# Patient Record
Sex: MALE | Race: WHITE | NOT HISPANIC OR LATINO | Employment: STUDENT | URBAN - METROPOLITAN AREA
[De-identification: names, ages, dates, MRNs, and addresses within clinical notes are randomized per-mention and may not be internally consistent; named-entity substitution may affect disease eponyms.]

---

## 2017-06-28 ENCOUNTER — HISTORICAL (OUTPATIENT)
Dept: LAB | Facility: HOSPITAL | Age: 22
End: 2017-06-28

## 2017-07-01 LAB — FINAL CULTURE: NORMAL

## 2021-09-25 ENCOUNTER — HOSPITAL ENCOUNTER (EMERGENCY)
Facility: HOSPITAL | Age: 26
Discharge: HOME | End: 2021-09-25
Attending: EMERGENCY MEDICINE
Payer: COMMERCIAL

## 2021-09-25 VITALS
DIASTOLIC BLOOD PRESSURE: 89 MMHG | HEIGHT: 67 IN | TEMPERATURE: 98 F | SYSTOLIC BLOOD PRESSURE: 128 MMHG | RESPIRATION RATE: 16 BRPM | OXYGEN SATURATION: 100 % | HEART RATE: 88 BPM | BODY MASS INDEX: 23.54 KG/M2 | WEIGHT: 150 LBS

## 2021-09-25 DIAGNOSIS — S01.81XA LACERATION OF FOREHEAD, INITIAL ENCOUNTER: Primary | ICD-10-CM

## 2021-09-25 PROCEDURE — 99284 EMERGENCY DEPT VISIT MOD MDM: CPT | Mod: 25,,, | Performed by: PHYSICIAN ASSISTANT

## 2021-09-25 PROCEDURE — 99284 PR EMERGENCY DEPT VISIT,LEVEL IV: ICD-10-PCS | Mod: 25,,, | Performed by: PHYSICIAN ASSISTANT

## 2021-09-25 PROCEDURE — 12011 RPR F/E/E/N/L/M 2.5 CM/<: CPT | Mod: ,,, | Performed by: PHYSICIAN ASSISTANT

## 2021-09-25 PROCEDURE — 12011 RPR F/E/E/N/L/M 2.5 CM/<: CPT

## 2021-09-25 PROCEDURE — 99282 EMERGENCY DEPT VISIT SF MDM: CPT | Mod: 25

## 2021-09-25 PROCEDURE — 12011 PR RESUPERF WND FACE <2.5 CM: ICD-10-PCS | Mod: ,,, | Performed by: PHYSICIAN ASSISTANT

## 2022-04-10 ENCOUNTER — HISTORICAL (OUTPATIENT)
Dept: ADMINISTRATIVE | Facility: HOSPITAL | Age: 27
End: 2022-04-10
Payer: COMMERCIAL

## 2022-04-24 VITALS
SYSTOLIC BLOOD PRESSURE: 124 MMHG | BODY MASS INDEX: 24.19 KG/M2 | WEIGHT: 154.13 LBS | DIASTOLIC BLOOD PRESSURE: 87 MMHG | OXYGEN SATURATION: 98 % | HEIGHT: 67 IN

## 2023-08-09 ENCOUNTER — OFFICE VISIT (OUTPATIENT)
Dept: URGENT CARE | Facility: CLINIC | Age: 28
End: 2023-08-09
Payer: COMMERCIAL

## 2023-08-09 VITALS
TEMPERATURE: 98 F | HEART RATE: 73 BPM | DIASTOLIC BLOOD PRESSURE: 74 MMHG | OXYGEN SATURATION: 97 % | SYSTOLIC BLOOD PRESSURE: 134 MMHG | RESPIRATION RATE: 18 BRPM | HEIGHT: 67 IN | BODY MASS INDEX: 23.23 KG/M2 | WEIGHT: 148 LBS

## 2023-08-09 DIAGNOSIS — R42 EPISODIC LIGHTHEADEDNESS: ICD-10-CM

## 2023-08-09 DIAGNOSIS — R53.83 FATIGUE, UNSPECIFIED TYPE: Primary | ICD-10-CM

## 2023-08-09 LAB
ALBUMIN SERPL-MCNC: 4.7 G/DL (ref 3.5–5)
ALBUMIN/GLOB SERPL: 2.1 RATIO (ref 1.1–2)
ALP SERPL-CCNC: 55 UNIT/L (ref 40–150)
ALT SERPL-CCNC: 12 UNIT/L (ref 0–55)
AST SERPL-CCNC: 18 UNIT/L (ref 5–34)
BASOPHILS # BLD AUTO: 0.07 X10(3)/MCL
BASOPHILS NFR BLD AUTO: 0.8 %
BILIRUB SERPL-MCNC: 0.8 MG/DL
BUN SERPL-MCNC: 7.9 MG/DL (ref 8.9–20.6)
CALCIUM SERPL-MCNC: 9.4 MG/DL (ref 8.4–10.2)
CHLORIDE SERPL-SCNC: 93 MMOL/L (ref 98–107)
CK SERPL-CCNC: 154 U/L (ref 30–200)
CO2 SERPL-SCNC: 28 MMOL/L (ref 22–29)
CREAT SERPL-MCNC: 0.77 MG/DL (ref 0.73–1.18)
EOSINOPHIL # BLD AUTO: 0.06 X10(3)/MCL (ref 0–0.9)
EOSINOPHIL NFR BLD AUTO: 0.7 %
ERYTHROCYTE [DISTWIDTH] IN BLOOD BY AUTOMATED COUNT: 12.2 % (ref 11.5–17)
GFR SERPLBLD CREATININE-BSD FMLA CKD-EPI: >60 MLS/MIN/1.73/M2
GLOBULIN SER-MCNC: 2.2 GM/DL (ref 2.4–3.5)
GLUCOSE SERPL-MCNC: 136 MG/DL (ref 74–100)
HCT VFR BLD AUTO: 41.3 % (ref 42–52)
HGB BLD-MCNC: 14.2 G/DL (ref 14–18)
IMM GRANULOCYTES # BLD AUTO: 0.03 X10(3)/MCL (ref 0–0.04)
IMM GRANULOCYTES NFR BLD AUTO: 0.4 %
LYMPHOCYTES # BLD AUTO: 1.49 X10(3)/MCL (ref 0.6–4.6)
LYMPHOCYTES NFR BLD AUTO: 17.9 %
MCH RBC QN AUTO: 30 PG (ref 27–31)
MCHC RBC AUTO-ENTMCNC: 34.4 G/DL (ref 33–36)
MCV RBC AUTO: 87.3 FL (ref 80–94)
MONOCYTES # BLD AUTO: 0.74 X10(3)/MCL (ref 0.1–1.3)
MONOCYTES NFR BLD AUTO: 8.9 %
NEUTROPHILS # BLD AUTO: 5.92 X10(3)/MCL (ref 2.1–9.2)
NEUTROPHILS NFR BLD AUTO: 71.3 %
NRBC BLD AUTO-RTO: 0 %
PLATELET # BLD AUTO: 219 X10(3)/MCL (ref 130–400)
PMV BLD AUTO: 10.5 FL (ref 7.4–10.4)
POTASSIUM SERPL-SCNC: 3.6 MMOL/L (ref 3.5–5.1)
PROT SERPL-MCNC: 6.9 GM/DL (ref 6.4–8.3)
RBC # BLD AUTO: 4.73 X10(6)/MCL (ref 4.7–6.1)
SODIUM SERPL-SCNC: 129 MMOL/L (ref 136–145)
TSH SERPL-ACNC: 0.52 UIU/ML (ref 0.35–4.94)
WBC # SPEC AUTO: 8.31 X10(3)/MCL (ref 4.5–11.5)

## 2023-08-09 PROCEDURE — 99213 OFFICE O/P EST LOW 20 MIN: CPT | Mod: ,,, | Performed by: FAMILY MEDICINE

## 2023-08-09 PROCEDURE — 80053 COMPREHEN METABOLIC PANEL: CPT | Performed by: FAMILY MEDICINE

## 2023-08-09 PROCEDURE — 85025 COMPLETE CBC W/AUTO DIFF WBC: CPT | Performed by: FAMILY MEDICINE

## 2023-08-09 PROCEDURE — 84443 ASSAY THYROID STIM HORMONE: CPT | Performed by: FAMILY MEDICINE

## 2023-08-09 PROCEDURE — 82550 ASSAY OF CK (CPK): CPT | Performed by: FAMILY MEDICINE

## 2023-08-09 PROCEDURE — 36415 COLL VENOUS BLD VENIPUNCTURE: CPT | Performed by: FAMILY MEDICINE

## 2023-08-09 PROCEDURE — 99213 PR OFFICE/OUTPT VISIT, EST, LEVL III, 20-29 MIN: ICD-10-PCS | Mod: ,,, | Performed by: FAMILY MEDICINE

## 2023-08-09 RX ORDER — TALC
6 POWDER (GRAM) TOPICAL NIGHTLY PRN
COMMUNITY

## 2023-08-09 RX ORDER — ASPIRIN 325 MG
325 TABLET ORAL DAILY
COMMUNITY

## 2023-08-09 NOTE — PROGRESS NOTES
Patient ID: [id number]     Chief Complaint:  Vomiting, dizziness, diarrhea    History of Present Illness:     Pierce Liam is a 28 y.o. male  who presents today for symptoms of Dizziness ( Patient is a 28 y.o. male who presents to urgent care with complaints of vomiting, dizziness, diarrhea, trouble concentrating x2 weeks. Alleviating factors include fluids, bed rest with copious amount of relief. Patient denies being around anyone who has been sick that he is aware of. )    28-year-old male with no significant past medical history presents today with a 2 week history of episodic lightheadedness, fatigue, and mental fog.  He works outdoors as a .  At the beginning of the 2 week.  He had a few episodes of vomiting with no blood but that subsided.  Today he had an episode of diarrhea with no blood.  He is had no upper respiratory symptoms, no shortness of breath, no chest pain.  The lightheadedness originally felt more like vertigo to him at the beginning of the 2 week period but that changed.  He is had no loss of consciousness or presyncopal episodes.  No new medications, no recent changes in medications.  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  This lightheadedness does not change with body or head position or movement, and sometimes occurs when he is sitting still and not moving his head.    Pt denies experiencing any fevers, chills, difficulty breathing, dysphagia, or neck stiffness.    Past Medical History:     ----------------------------  Depression     History reviewed. No pertinent surgical history.    Review of patient's allergies indicates:  No Known Allergies    Outpatient Medications Marked as Taking for the 8/9/23 encounter (Office Visit) with Nikos Graf MD   Medication Sig Dispense Refill    aspirin 325 MG tablet Take 325 mg by mouth once daily.      melatonin (MELATIN) 3 mg tablet Take 6 mg by mouth  "nightly as needed for Insomnia.         Social History     Socioeconomic History    Marital status: Single   Tobacco Use    Smoking status: Never    Smokeless tobacco: Never   Substance and Sexual Activity    Alcohol use: Yes     Comment: occas    Drug use: Never        Family History   Problem Relation Age of Onset    Hypertension Father     Hypertension Maternal Grandfather         Subjective:     Review of Systems   Constitutional:  Negative for chills, fever and malaise/fatigue.   HENT:  Negative for congestion, ear discharge, ear pain, sinus pain and sore throat.    Respiratory:  Negative for cough, sputum production, shortness of breath, wheezing and stridor.    Gastrointestinal:  Negative for abdominal pain, diarrhea, nausea and vomiting.   Genitourinary:  Negative for dysuria, frequency and urgency.   Musculoskeletal:  Negative for neck pain.   Skin:  Negative for rash.   Neurological:  Negative for headaches.       Objective:     /74   Pulse 73   Temp 98.4 °F (36.9 °C)   Resp 18   Ht 5' 7" (1.702 m)   Wt 67.1 kg (148 lb)   SpO2 97%   BMI 23.18 kg/m²     Physical Exam  Constitutional:       General: He is not in acute distress.     Appearance: Normal appearance. He is normal weight. He is not ill-appearing or toxic-appearing.   HENT:      Head: Normocephalic and atraumatic.      Right Ear: Tympanic membrane and ear canal normal.      Left Ear: Tympanic membrane and ear canal normal.      Nose: No congestion or rhinorrhea.      Mouth/Throat:      Mouth: Mucous membranes are moist.      Pharynx: Oropharynx is clear. No oropharyngeal exudate or posterior oropharyngeal erythema.   Eyes:      General:         Right eye: No discharge.         Left eye: No discharge.      Extraocular Movements: Extraocular movements intact.      Conjunctiva/sclera: Conjunctivae normal.   Cardiovascular:      Rate and Rhythm: Normal rate and regular rhythm.      Heart sounds: Normal heart sounds. No murmur heard.     No " friction rub. No gallop.   Pulmonary:      Effort: Pulmonary effort is normal. No respiratory distress.      Breath sounds: Normal breath sounds. No stridor. No wheezing, rhonchi or rales.   Chest:      Chest wall: No tenderness.   Musculoskeletal:      Cervical back: No rigidity or tenderness.   Lymphadenopathy:      Cervical: No cervical adenopathy.   Skin:     Capillary Refill: Capillary refill takes less than 2 seconds.      Coloration: Skin is not pale.   Neurological:      Mental Status: He is alert and oriented to person, place, and time. Mental status is at baseline.   Psychiatric:         Mood and Affect: Mood normal.         Behavior: Behavior normal.         Assessment & Plan:       ICD-10-CM ICD-9-CM   1. Fatigue, unspecified type  R53.83 780.79   2. Episodic lightheadedness  R42 780.4        1. Fatigue, unspecified type  -     CBC Auto Differential; Future; Expected date: 08/09/2023  -     Comprehensive Metabolic Panel  -     CK; Future; Expected date: 08/09/2023  -     TSH; Future; Expected date: 08/09/2023    2. Episodic lightheadedness  -     CBC Auto Differential; Future; Expected date: 08/09/2023  -     Comprehensive Metabolic Panel  -     CK; Future; Expected date: 08/09/2023  -     TSH; Future; Expected date: 08/09/2023       Vitals are stable today.  Physical exam is normal.  Does not need IV fluid administration at this point.  We will get basic labs and have him follow-up with PCP.

## 2023-08-09 NOTE — LETTER
August 9, 2023      [facility]  [address], [address]  [place] [address]  Phone: [phone]  Fax: [phone]       Patient: Pierce Lima   YOB: 1995  Date of Visit: 08/09/2023    To Whom It May Concern:    Ann Lima  was at [facility] on 08/09/2023. The patient may return to work/school on 08/10/2023 with no restrictions. If you have any questions or concerns, or if I can be of further assistance, please do not hesitate to contact me.    Sincerely,    Nikos Graf MD

## 2023-08-09 NOTE — PATIENT INSTRUCTIONS
We will call you with your laboratory results and forward them to your primary care physician tomorrow.    Please go to the emergency room if you develop any alarming signs or symptoms.

## 2023-08-10 NOTE — PROGRESS NOTES
Please contact Talha to have him follow up with us in clinic tomorrow if possible. Would like to know how he is feeling today. His low sodium level is likely from decreased oral intake. Would love for him to try some bland foods today (toast, eggs, rice, potatoes, bananas) and increase his fluid intake ( would recommend Pedialyte in addition to water). Will need to repeat his labs at our visit.

## 2023-12-06 PROCEDURE — 83090 ASSAY OF HOMOCYSTEINE: CPT | Performed by: STUDENT IN AN ORGANIZED HEALTH CARE EDUCATION/TRAINING PROGRAM

## 2023-12-06 PROCEDURE — 82607 VITAMIN B-12: CPT | Performed by: STUDENT IN AN ORGANIZED HEALTH CARE EDUCATION/TRAINING PROGRAM

## 2024-02-11 PROBLEM — F33.1 MAJOR DEPRESSIVE DISORDER, RECURRENT, MODERATE: Status: ACTIVE | Noted: 2024-02-11

## 2024-02-13 PROBLEM — F33.1 MAJOR DEPRESSIVE DISORDER, RECURRENT, MODERATE: Chronic | Status: ACTIVE | Noted: 2024-02-11

## 2024-05-15 PROBLEM — Z00.00 WELLNESS EXAMINATION: Status: ACTIVE | Noted: 2024-05-15

## 2024-05-25 ENCOUNTER — OFFICE VISIT (OUTPATIENT)
Dept: URGENT CARE | Facility: CLINIC | Age: 29
End: 2024-05-25
Payer: COMMERCIAL

## 2024-05-25 VITALS
OXYGEN SATURATION: 97 % | SYSTOLIC BLOOD PRESSURE: 121 MMHG | BODY MASS INDEX: 25.27 KG/M2 | RESPIRATION RATE: 16 BRPM | TEMPERATURE: 98 F | DIASTOLIC BLOOD PRESSURE: 74 MMHG | HEIGHT: 67 IN | WEIGHT: 161 LBS | HEART RATE: 66 BPM

## 2024-05-25 DIAGNOSIS — T67.5XXA HEAT EXHAUSTION, INITIAL ENCOUNTER: Primary | ICD-10-CM

## 2024-05-25 PROCEDURE — 99214 OFFICE O/P EST MOD 30 MIN: CPT | Mod: ,,, | Performed by: FAMILY MEDICINE

## 2024-05-25 RX ORDER — SODIUM CHLORIDE, SODIUM LACTATE, POTASSIUM CHLORIDE, CALCIUM CHLORIDE 600; 310; 30; 20 MG/100ML; MG/100ML; MG/100ML; MG/100ML
INJECTION, SOLUTION INTRAVENOUS
Status: COMPLETED | OUTPATIENT
Start: 2024-05-25 | End: 2024-05-25

## 2024-05-25 RX ADMIN — SODIUM CHLORIDE, SODIUM LACTATE, POTASSIUM CHLORIDE, CALCIUM CHLORIDE: 600; 310; 30; 20 INJECTION, SOLUTION INTRAVENOUS at 03:05

## 2024-05-25 NOTE — PATIENT INSTRUCTIONS
Discussed the condition and course in detail.  IV fluids Ringer lactate in the clinic today.  Patient tolerated well.  Symptoms resolved.  Discharged home with stable vital signs  Encouraged to monitor the symptoms, conservative methods to stay hydrated and cooling methods at work  Call return to clinic for any questions.  Follow up with primary MD.  Voiced understanding  Call this clinic for any questions.  ER precautions

## 2024-05-25 NOTE — PROGRESS NOTES
"Subjective:      Patient ID: Pierce Lima is a 29 y.o. male.    Vitals:  height is 5' 7" (1.702 m) and weight is 73 kg (161 lb). His temperature is 97.7 °F (36.5 °C). His blood pressure is 122/81 and his pulse is 72. His respiration is 16 and oxygen saturation is 100%.     Chief Complaint: Dizziness     Patient is a 29 y.o. male who presents to urgent care with complaints of dizziness, nausea, off balance x today- states worked out side in the sun. Alleviating factors include none.   ROS :  Constitutional : No fever, no fatigue lens of feeling dizzy since today  Neck : Negative except HPI  Respiratory : No shortness of breath, no wheezing  Cardiovascular : No chest pain  Musculoskeletal : Negative except HPI  Integumentary : No rash, no abnormal lesion  Neurological : Negative for tingling numbness and weakness     29-year-old male present to clinic with concerns of feeling nauseated and exhausted today started around 1:00 p.m..  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  Tried hydration like Gatorade Powerade and Pedialyte.  Similar complaints two days ago.  No fall no trauma.  No vomiting.  Patient had wellness exam with primary MD 10 days ago, labs in the chart reviewed.  No chest pain, no palpitations.  No shortness a breath or dyspnea on exertion.    Objective:     Physical Exam  General : Alert and Oriented, No apparent distress, afebrile, appears comfortable on exam table, clear speech, appears anxious  Neck - supple  HENT : Oropharynx no redness or swelling.   Respiratory : Bilateral equal breath sounds, nonlabored respirations  Cardiovascular : Rate, rhythm regular, normal volume pulse, no murmur  Gastrointestinal: Full abdomen, soft, nontender to palpate  Integumentary : Warm, Dry and no rash    IV Ringer lactate started 3:15 p.m..  Patient appears comfortable sitting reclined on the exam table.  No new symptoms.  Continuing IV fluids  3:30 p.m.:  Patient appears comfortable on the exam table.  " Denies shortness a breath or chest pain.  No nausea or vomiting.  3:50 p.m.:  Tolerating IV fluids.  No new complaints.  Lungs clear to auscultate.  States symptoms much improved.  4.10 p.m.:  Used restroom  4:20 p.m.:  Completed IV fluids.  Symptoms resolved.  Discharged home with stable vital signs  Assessment:     1. Heat exhaustion, initial encounter      Plan:   Discussed the condition and course in detail.  IV fluids Ringer lactate in the clinic today.  Patient tolerated well.  Symptoms resolved.  Discharged home with stable vital signs  Encouraged to monitor the symptoms, conservative methods to stay hydrated and cooling methods at work  Call return to clinic for any questions.  Follow up with primary MD.  Voiced understanding  Call this clinic for any questions.  ER precautions    Heat exhaustion, initial encounter  -     lactated ringers infusion

## 2024-05-29 ENCOUNTER — OFFICE VISIT (OUTPATIENT)
Dept: URGENT CARE | Facility: CLINIC | Age: 29
End: 2024-05-29
Payer: COMMERCIAL

## 2024-05-29 VITALS
WEIGHT: 161 LBS | RESPIRATION RATE: 18 BRPM | OXYGEN SATURATION: 99 % | HEART RATE: 70 BPM | DIASTOLIC BLOOD PRESSURE: 89 MMHG | SYSTOLIC BLOOD PRESSURE: 139 MMHG | HEIGHT: 67 IN | BODY MASS INDEX: 25.27 KG/M2 | TEMPERATURE: 98 F

## 2024-05-29 DIAGNOSIS — R53.83 FATIGUE, UNSPECIFIED TYPE: Primary | ICD-10-CM

## 2024-05-29 PROCEDURE — 99213 OFFICE O/P EST LOW 20 MIN: CPT | Mod: ,,, | Performed by: NURSE PRACTITIONER

## 2024-05-29 NOTE — LETTER
May 29, 2024      [facility]  [address], [address]  [place] [address]  Phone: [phone]  Fax: [phone]       Patient: Pierce Lima   YOB: 1995  Date of Visit: 05/29/2024    To Whom It May Concern:    Ann Lima  was at [facility] on 05/29/2024. The patient may return to work/school on 5/30/2024 with no restrictions. If you have any questions or concerns, or if I can be of further assistance, please do not hesitate to contact me.    Sincerely,    Nolan Cox NP

## 2024-05-29 NOTE — PATIENT INSTRUCTIONS
As discussed keep scheduled follow up appointment on Friday  Monitor for any worsening symptoms and follow up with PCP if improvement is not occurring in the near future

## 2024-05-29 NOTE — PROGRESS NOTES
"Subjective:      Patient ID: Pierce Lima is a 29 y.o. male.    Vitals:  height is 5' 7" (1.702 m) and weight is 73 kg (161 lb). His temperature is 97.6 °F (36.4 °C). His blood pressure is 139/89 and his pulse is 70. His respiration is 18 and oxygen saturation is 99%.     Chief Complaint: Nausea    29 y.o. male presents to clinic with c/o nausea, brain fog, difficulty concentrating starting at noon today. Pt states he's been having these episodes x 1 year.  Was seen 5 days ago and given IV fluids for dehydration like symptoms states feeling hydrated and has been drinking plenty of fluids and electrolyte replacement drinks but still having ongoing intermittent headaches intermittent nausea and feelings of brain fog.  We did discuss that he has tried multiple medications throughout his past for anxiety and depression but none have ever been very effective.  He has currently not on any medication.  He was seen by his PCP roughly 2 weeks ago and states having completely normal lab work and voicing these concerns to his PCP nothing was further ordered or investigated afterwards.  He does have a appointment with his psychiatrist in 2 days.  Denies any tachycardia palpitations dizziness currently he has been eating and drinking well        Constitution: Positive for fatigue.   HENT: Negative.     Neck: neck negative.   Cardiovascular: Negative.    Eyes: Negative.    Respiratory: Negative.     Endocrine: negative.   Genitourinary: Negative.    Skin: Negative.    Neurological:  Positive for headaches.      Objective:     Physical Exam   Constitutional: He is oriented to person, place, and time. He appears well-developed. He is cooperative.  Non-toxic appearance. He does not appear ill. No distress.   HENT:   Head: Normocephalic and atraumatic.   Ears:   Right Ear: Hearing, tympanic membrane, external ear and ear canal normal.   Left Ear: Hearing, tympanic membrane, external ear and ear canal normal.   Nose: Nose normal. " No mucosal edema, rhinorrhea or nasal deformity. No epistaxis. Right sinus exhibits no maxillary sinus tenderness and no frontal sinus tenderness. Left sinus exhibits no maxillary sinus tenderness and no frontal sinus tenderness.   Mouth/Throat: Uvula is midline, oropharynx is clear and moist and mucous membranes are normal. No trismus in the jaw. Normal dentition. No uvula swelling. No oropharyngeal exudate, posterior oropharyngeal edema or posterior oropharyngeal erythema.   Eyes: Conjunctivae and lids are normal. No scleral icterus.   Neck: Trachea normal and phonation normal. Neck supple. No edema present. No erythema present. No neck rigidity present.   Cardiovascular: Normal rate, regular rhythm, normal heart sounds and normal pulses.   Pulmonary/Chest: Effort normal and breath sounds normal. No respiratory distress. He has no decreased breath sounds. He has no rhonchi.   Abdominal: Normal appearance.   Musculoskeletal: Normal range of motion.         General: No deformity. Normal range of motion.   Neurological: He is alert and oriented to person, place, and time. He exhibits normal muscle tone. Coordination normal.   Skin: Skin is warm, dry, intact, not diaphoretic and not pale.   Psychiatric: His speech is normal and behavior is normal. Judgment and thought content normal.   Nursing note and vitals reviewed.      Assessment:     1. Fatigue, unspecified type        Plan:   After discussion with the patient it was advised to follow up with his scheduled appointment on Friday with his psych NP for further guidance and evaluation of this questionable feeling a brain fog and inability to concentrate.    Fatigue, unspecified type

## 2024-07-05 ENCOUNTER — OFFICE VISIT (OUTPATIENT)
Dept: URGENT CARE | Facility: CLINIC | Age: 29
End: 2024-07-05
Payer: COMMERCIAL

## 2024-07-05 VITALS
HEIGHT: 67 IN | WEIGHT: 160 LBS | RESPIRATION RATE: 18 BRPM | SYSTOLIC BLOOD PRESSURE: 127 MMHG | HEART RATE: 60 BPM | DIASTOLIC BLOOD PRESSURE: 81 MMHG | TEMPERATURE: 99 F | BODY MASS INDEX: 25.11 KG/M2 | OXYGEN SATURATION: 99 %

## 2024-07-05 DIAGNOSIS — R53.83 FATIGUE, UNSPECIFIED TYPE: Primary | ICD-10-CM

## 2024-07-05 LAB
ALBUMIN SERPL-MCNC: 4.2 G/DL (ref 3.5–5)
ALBUMIN/GLOB SERPL: 1.5 RATIO (ref 1.1–2)
ALP SERPL-CCNC: 63 UNIT/L (ref 40–150)
ALT SERPL-CCNC: 19 UNIT/L (ref 0–55)
ANION GAP SERPL CALC-SCNC: 10 MEQ/L
AST SERPL-CCNC: 19 UNIT/L (ref 5–34)
BASOPHILS # BLD AUTO: 0.06 X10(3)/MCL
BASOPHILS NFR BLD AUTO: 0.7 %
BILIRUB SERPL-MCNC: 0.5 MG/DL
BUN SERPL-MCNC: 10.9 MG/DL (ref 8.9–20.6)
CALCIUM SERPL-MCNC: 9.5 MG/DL (ref 8.4–10.2)
CHLORIDE SERPL-SCNC: 101 MMOL/L (ref 98–107)
CO2 SERPL-SCNC: 26 MMOL/L (ref 22–29)
CREAT SERPL-MCNC: 0.88 MG/DL (ref 0.73–1.18)
CREAT/UREA NIT SERPL: 12
EOSINOPHIL # BLD AUTO: 0.21 X10(3)/MCL (ref 0–0.9)
EOSINOPHIL NFR BLD AUTO: 2.5 %
ERYTHROCYTE [DISTWIDTH] IN BLOOD BY AUTOMATED COUNT: 12.1 % (ref 11.5–17)
GFR SERPLBLD CREATININE-BSD FMLA CKD-EPI: >60 ML/MIN/1.73/M2
GLOBULIN SER-MCNC: 2.8 GM/DL (ref 2.4–3.5)
GLUCOSE SERPL-MCNC: 127 MG/DL (ref 74–100)
HCT VFR BLD AUTO: 42.8 % (ref 42–52)
HGB BLD-MCNC: 14.8 G/DL (ref 14–18)
IMM GRANULOCYTES # BLD AUTO: 0.04 X10(3)/MCL (ref 0–0.04)
IMM GRANULOCYTES NFR BLD AUTO: 0.5 %
LYMPHOCYTES # BLD AUTO: 1.86 X10(3)/MCL (ref 0.6–4.6)
LYMPHOCYTES NFR BLD AUTO: 21.8 %
MCH RBC QN AUTO: 30.7 PG (ref 27–31)
MCHC RBC AUTO-ENTMCNC: 34.6 G/DL (ref 33–36)
MCV RBC AUTO: 88.8 FL (ref 80–94)
MONOCYTES # BLD AUTO: 0.6 X10(3)/MCL (ref 0.1–1.3)
MONOCYTES NFR BLD AUTO: 7 %
NEUTROPHILS # BLD AUTO: 5.78 X10(3)/MCL (ref 2.1–9.2)
NEUTROPHILS NFR BLD AUTO: 67.5 %
NRBC BLD AUTO-RTO: 0 %
PLATELET # BLD AUTO: 240 X10(3)/MCL (ref 130–400)
PMV BLD AUTO: 10.7 FL (ref 7.4–10.4)
POTASSIUM SERPL-SCNC: 4 MMOL/L (ref 3.5–5.1)
PROT SERPL-MCNC: 7 GM/DL (ref 6.4–8.3)
RBC # BLD AUTO: 4.82 X10(6)/MCL (ref 4.7–6.1)
SODIUM SERPL-SCNC: 137 MMOL/L (ref 136–145)
TSH SERPL-ACNC: 0.52 UIU/ML (ref 0.35–4.94)
WBC # BLD AUTO: 8.55 X10(3)/MCL (ref 4.5–11.5)

## 2024-07-05 PROCEDURE — 36415 COLL VENOUS BLD VENIPUNCTURE: CPT | Performed by: PHYSICIAN ASSISTANT

## 2024-07-05 NOTE — PROGRESS NOTES
"Subjective:      Patient ID: Pierce Lima is a 29 y.o. male.    Vitals:  height is 5' 7" (1.702 m) and weight is 72.6 kg (160 lb). His temperature is 99 °F (37.2 °C). His blood pressure is 127/81 and his pulse is 60. His respiration is 18 and oxygen saturation is 99%.     Chief Complaint: Fatigue     Patient is a 29 y.o. male who presents to urgent care with complaints of fatigue and lack of ability to concentrate. Pt was seen on 5/29/24 for same symptoms. Pt states he's seen his PCP regarding the issue but his PCP believes the fatigue is due to his depression.  Patient did start Vraylar this week.      ROS   Objective:     Physical Exam   Constitutional: He is oriented to person, place, and time. He appears well-developed. He is cooperative.  Non-toxic appearance. He does not appear ill. No distress.   HENT:   Head: Normocephalic and atraumatic.   Ears:   Right Ear: Hearing, tympanic membrane, external ear and ear canal normal.   Left Ear: Hearing, tympanic membrane, external ear and ear canal normal.   Nose: Nose normal. No nasal deformity. No epistaxis.   Mouth/Throat: Uvula is midline, oropharynx is clear and moist and mucous membranes are normal. No trismus in the jaw. Normal dentition. No uvula swelling. No oropharyngeal exudate, posterior oropharyngeal edema or posterior oropharyngeal erythema.   Eyes: Conjunctivae and lids are normal. No scleral icterus.   Neck: Trachea normal and phonation normal. Neck supple. No edema present. No erythema present. No neck rigidity present.   Cardiovascular: Normal rate, regular rhythm, normal heart sounds and normal pulses.   Pulmonary/Chest: Effort normal and breath sounds normal. No respiratory distress. He has no decreased breath sounds. He has no rhonchi.   Abdominal: Normal appearance.   Musculoskeletal: Normal range of motion.         General: No deformity. Normal range of motion.   Neurological: He is alert and oriented to person, place, and time. He exhibits " "normal muscle tone. Coordination normal.   Skin: Skin is warm, dry, intact, not diaphoretic and not pale.   Psychiatric: His speech is normal and behavior is normal. Judgment and thought content normal.   Nursing note and vitals reviewed.  Labs appear normal in May.  Patient is requesting repeat lab work         Previous History      Review of patient's allergies indicates:  No Known Allergies    Past Medical History:   Diagnosis Date    Anxiety     Depression      Current Outpatient Medications   Medication Instructions    aspirin 325 mg, Oral, Daily    melatonin (MELATIN) 6 mg, Oral, Nightly PRN    propranoloL (INDERAL [place]) 60 mg, Oral    VRAYLAR 1.5 mg, Oral, Daily     Past Surgical History:   Procedure Laterality Date    NO PAST SURGERIES       Family History   Problem Relation Name Age of Onset    No Known Problems Mother      Hypertension Father      No Known Problems Sister      No Known Problems Brother      Hypertension Maternal Grandfather      No Known Problems Other         Social History     Tobacco Use    Smoking status: Never     Passive exposure: Never    Smokeless tobacco: Never   Substance Use Topics    Alcohol use: Yes     Comment: occas    Drug use: Never        Physical Exam      Vital Signs Reviewed   /81   Pulse 60   Temp 99 °F (37.2 °C)   Resp 18   Ht 5' 7" (1.702 m)   Wt 72.6 kg (160 lb)   SpO2 99%   BMI 25.06 kg/m²        Procedures    Procedures     Labs     Results for orders placed or performed in visit on 05/15/24   Comprehensive Metabolic Panel   Result Value Ref Range    Sodium 140 136 - 145 mmol/L    Potassium 4.3 3.5 - 5.1 mmol/L    Chloride 101 98 - 107 mmol/L    CO2 29 21 - 32 mmol/L    Glucose 91 74 - 106 mg/dL    Blood Urea Nitrogen 16 7.0 - 18.0 mg/dL    Creatinine 0.77 0.60 - 1.30 mg/dL    Calcium 10.1 8.5 - 10.1 mg/dL    Protein Total 7.0 6.4 - 8.2 gm/dL    Albumin 4.9 3.4 - 5.0 g/dL    Globulin 2.1 1.2 - 3.0 gm/dL    Albumin/Globulin Ratio 2.3 (H) 1.5 - 2.0 ratio "    Bilirubin Total 0.6 0.2 - 1.0 mg/dL    ALP 45 38 - 126 unit/L    ALT 17 7 - 52 unit/L    AST 18 15 - 37 unit/L    eGFR >60 mL/min/1.73/m2   Bilirubin, Direct   Result Value Ref Range    Bilirubin Direct 0.1 0.0 - 0.5 mg/dL   Lipid Panel   Result Value Ref Range    Cholesterol Total 197 0 - 200 mg/dL    HDL Cholesterol 59 35 - 60 mg/dL    Triglyceride 91 30 - 150 mg/dL    Cholesterol/HDL Ratio 3     Very Low Density Lipoprotein 18     LDL Cholesterol 120.00 0.00 - 129.00 mg/dL   TSH   Result Value Ref Range    TSH 0.925 0.350 - 4.940 uIU/mL   Urinalysis   Result Value Ref Range    Color, UA Yellow Yellow, Light-Yellow, Dark Yellow, Lilly, Straw    Appearance, UA Clear Clear    Specific Gravity, UA 1.010 1.005 - 1.030    pH, UA 7.0 5.0 - 8.5    Protein, UA Negative Negative    Glucose, UA Negative Negative, Normal    Ketones, UA Negative Negative    Blood, UA Negative Negative    Bilirubin, UA Negative Negative    Urobilinogen, UA 0.2 0.2, 1.0, Normal    Nitrites, UA Negative Negative    Leukocyte Esterase, UA Negative Negative   CBC with Differential   Result Value Ref Range    WBC 5.90 4.50 - 11.50 x10(3)/mcL    RBC 4.93 4.70 - 6.10 x10(6)/mcL    Hgb 14.4 14.0 - 18.0 g/dL    Hct 43.3 42.0 - 52.0 %    MCV 87.8 80.0 - 94.0 fL    MCH 29.2 27.0 - 31.0 pg    MCHC 33.3 33.0 - 36.0 g/dL    RDW 12.9 11.5 - 17.0 %    Platelet 209 130 - 400 x10(3)/mcL    MPV 9.5 7.4 - 10.4 fL    Neut % 61.8 %    Lymph % 29.2 %    Mono % 9.0 %    Lymph # 1.7 0.6 - 4.6 x10(3)/mcL    Neut # 3.7 2.1 - 9.2 x10(3)/mcL    Mono # 0.5 0.1 - 1.3 x10(3)/mcL   Urinalysis, Microscopic   Result Value Ref Range    Bacteria, UA None Seen None Seen, Rare, Occasional /HPF    RBC, UA None Seen None Seen, 0-2, 3-5, 0-5 /HPF    WBC, UA None Seen None Seen, 0-2, 3-5, 0-5 /HPF    Squamous Epithelial Cells, UA None Seen None Seen, Rare, Occasional, Occ /HPF       Assessment:     1. Fatigue, unspecified type        Plan:       Fatigue, unspecified type  -     CBC  Auto Differential  -     Comprehensive Metabolic Panel  -     TSH; Future; Expected date: 07/05/2024        Follow up with your primary care doctor.

## 2024-07-06 NOTE — PROGRESS NOTES
Lab work all essentially within normal limits.  Since this was a nonfasting lab work set, glucose is within normal limits for a nonfasting glucose check.  Complete blood count relatively unremarkable.  Based on these symptoms fatigue is likely a result of state of mental health versus other.  Follow up with referral to Psychiatry as well as PCP.

## 2024-08-19 PROBLEM — Z00.00 WELLNESS EXAMINATION: Status: RESOLVED | Noted: 2024-05-15 | Resolved: 2024-08-19

## 2024-11-27 ENCOUNTER — OFFICE VISIT (OUTPATIENT)
Dept: URGENT CARE | Facility: CLINIC | Age: 29
End: 2024-11-27
Payer: COMMERCIAL

## 2024-11-27 VITALS
BODY MASS INDEX: 25.9 KG/M2 | HEIGHT: 67 IN | WEIGHT: 165 LBS | OXYGEN SATURATION: 98 % | RESPIRATION RATE: 18 BRPM | SYSTOLIC BLOOD PRESSURE: 134 MMHG | HEART RATE: 75 BPM | DIASTOLIC BLOOD PRESSURE: 91 MMHG | TEMPERATURE: 98 F

## 2024-11-27 DIAGNOSIS — R11.0 NAUSEA: Primary | ICD-10-CM

## 2024-11-27 DIAGNOSIS — R53.83 FATIGUE, UNSPECIFIED TYPE: ICD-10-CM

## 2024-11-27 DIAGNOSIS — R19.7 DIARRHEA, UNSPECIFIED TYPE: ICD-10-CM

## 2024-11-27 LAB — GLUCOSE SERPL-MCNC: 136 MG/DL (ref 70–110)

## 2024-11-27 PROCEDURE — 99213 OFFICE O/P EST LOW 20 MIN: CPT | Mod: ,,, | Performed by: NURSE PRACTITIONER

## 2024-11-27 RX ORDER — ONDANSETRON 4 MG/1
8 TABLET, ORALLY DISINTEGRATING ORAL EVERY 12 HOURS PRN
Qty: 20 TABLET | Refills: 0 | Status: SHIPPED | OUTPATIENT
Start: 2024-11-27 | End: 2024-12-07

## 2024-11-27 NOTE — PATIENT INSTRUCTIONS
Take zofran as needed for nausea.  Immodium OTC for diarrhea  Rest and stay hydrated.  Take tylenol/motrin as needed for pain/fever.  Eat a bland diet for the next few days while your stomach recovers.  Please follow up with your primary care provider within 2-5 days if your signs and symptoms have not resolved or worsen.   If your condition worsens or fails to improve we recommend that you receive another evaluation at the emergency room immediately or contact your primary medical clinic to discuss your concerns.   You must understand that you have received an Urgent Care treatment only and that you may be released before all of your medical problems are known or treated. You, the patient, will arrange for follow up care as instructed.

## 2024-11-27 NOTE — PROGRESS NOTES
"Subjective:      Patient ID: Pierce Lima is a 29 y.o. male.    Vitals:  height is 5' 7" (1.702 m) and weight is 74.8 kg (165 lb). His temperature is 98.2 °F (36.8 °C). His blood pressure is 134/91 (abnormal) and his pulse is 75. His respiration is 18 and oxygen saturation is 98%.     Chief Complaint: Fatigue     Patient is a 29 y.o. male who presents to urgent care with complaints of fatigue, abdominal bloating, nausea, diarrhea x 4 days; no medications taken for nausea or diarrhea.        Gastrointestinal:  Positive for abdominal pain (generalized), nausea, vomiting and diarrhea.      Objective:     Physical Exam   Constitutional: He is oriented to person, place, and time. He appears well-developed.   HENT:   Head: Normocephalic and atraumatic.   Ears:   Right Ear: External ear normal.   Left Ear: External ear normal.   Nose: Nose normal.   Mouth/Throat: Mucous membranes are normal.   Eyes: Conjunctivae and lids are normal.   Neck: Trachea normal. Neck supple.   Cardiovascular: Normal rate, regular rhythm and normal heart sounds.   Pulmonary/Chest: Effort normal and breath sounds normal. No respiratory distress.   Abdominal: Normal appearance and bowel sounds are normal. He exhibits no distension and no mass. Soft. There is no abdominal tenderness.   Musculoskeletal: Normal range of motion.         General: Normal range of motion.   Neurological: He is alert and oriented to person, place, and time. He has normal strength.   Skin: Skin is warm, dry, intact, not diaphoretic and not pale.   Psychiatric: His speech is normal and behavior is normal. Judgment and thought content normal.   Nursing note and vitals reviewed.    Previous History:     Review of patient's allergies indicates:  No Known Allergies    Past Medical History:   Diagnosis Date    Anxiety     Depression      Current Outpatient Medications   Medication Instructions    ALPRAZolam (XANAX XR) 0.5 mg, Daily    aspirin 325 mg, Daily    melatonin " (MELATIN) 6 mg, Nightly PRN    ondansetron (ZOFRAN-ODT) 8 mg, Oral, Every 12 hours PRN     Past Surgical History:   Procedure Laterality Date    NO PAST SURGERIES       Family History   Problem Relation Name Age of Onset    No Known Problems Mother      Hypertension Father      No Known Problems Sister      No Known Problems Brother      Hypertension Maternal Grandfather      No Known Problems Other         Social History     Tobacco Use    Smoking status: Never     Passive exposure: Never    Smokeless tobacco: Never   Substance Use Topics    Alcohol use: Yes     Comment: occas    Drug use: Never       Assessment:     1. Nausea    2. Diarrhea, unspecified type        Plan:   Take zofran as needed for nausea.  Immodium OTC for diarrhea  Rest and stay hydrated.  Take tylenol/motrin as needed for pain/fever.  Eat a bland diet for the next few days while your stomach recovers.  Please follow up with your primary care provider within 2-5 days if your signs and symptoms have not resolved or worsen.   If your condition worsens or fails to improve we recommend that you receive another evaluation at the emergency room immediately or contact your primary medical clinic to discuss your concerns.      Nausea  -     ondansetron (ZOFRAN-ODT) 4 MG TbDL; Take 2 tablets (8 mg total) by mouth every 12 (twelve) hours as needed (nausea).  Dispense: 20 tablet; Refill: 0    Diarrhea, unspecified type